# Patient Record
Sex: FEMALE | Race: WHITE | NOT HISPANIC OR LATINO | Employment: FULL TIME | RURAL
[De-identification: names, ages, dates, MRNs, and addresses within clinical notes are randomized per-mention and may not be internally consistent; named-entity substitution may affect disease eponyms.]

---

## 2022-06-14 ENCOUNTER — OFFICE VISIT (OUTPATIENT)
Dept: RHEUMATOLOGY | Facility: CLINIC | Age: 37
End: 2022-06-14
Payer: COMMERCIAL

## 2022-06-14 VITALS
WEIGHT: 125.81 LBS | DIASTOLIC BLOOD PRESSURE: 92 MMHG | RESPIRATION RATE: 16 BRPM | HEART RATE: 100 BPM | SYSTOLIC BLOOD PRESSURE: 138 MMHG | OXYGEN SATURATION: 97 %

## 2022-06-14 DIAGNOSIS — M47.899 HLA-B27 SPONDYLOARTHROPATHY: ICD-10-CM

## 2022-06-14 DIAGNOSIS — H57.9: Primary | ICD-10-CM

## 2022-06-14 DIAGNOSIS — K50.119 CROHN'S COLITIS, UNSPECIFIED COMPLICATION: ICD-10-CM

## 2022-06-14 DIAGNOSIS — H20.9 UVEITIS OF RIGHT EYE: ICD-10-CM

## 2022-06-14 DIAGNOSIS — M45.9: Primary | ICD-10-CM

## 2022-06-14 PROCEDURE — 1160F RVW MEDS BY RX/DR IN RCRD: CPT | Mod: ,,, | Performed by: INTERNAL MEDICINE

## 2022-06-14 PROCEDURE — 3075F PR MOST RECENT SYSTOLIC BLOOD PRESS GE 130-139MM HG: ICD-10-PCS | Mod: ,,, | Performed by: INTERNAL MEDICINE

## 2022-06-14 PROCEDURE — 99205 OFFICE O/P NEW HI 60 MIN: CPT | Mod: PBBFAC | Performed by: INTERNAL MEDICINE

## 2022-06-14 PROCEDURE — 1159F PR MEDICATION LIST DOCUMENTED IN MEDICAL RECORD: ICD-10-PCS | Mod: ,,, | Performed by: INTERNAL MEDICINE

## 2022-06-14 PROCEDURE — 1160F PR REVIEW ALL MEDS BY PRESCRIBER/CLIN PHARMACIST DOCUMENTED: ICD-10-PCS | Mod: ,,, | Performed by: INTERNAL MEDICINE

## 2022-06-14 PROCEDURE — 1159F MED LIST DOCD IN RCRD: CPT | Mod: ,,, | Performed by: INTERNAL MEDICINE

## 2022-06-14 PROCEDURE — 99205 PR OFFICE/OUTPT VISIT, NEW, LEVL V, 60-74 MIN: ICD-10-PCS | Mod: S$PBB,,, | Performed by: INTERNAL MEDICINE

## 2022-06-14 PROCEDURE — 3075F SYST BP GE 130 - 139MM HG: CPT | Mod: ,,, | Performed by: INTERNAL MEDICINE

## 2022-06-14 PROCEDURE — 99205 OFFICE O/P NEW HI 60 MIN: CPT | Mod: S$PBB,,, | Performed by: INTERNAL MEDICINE

## 2022-06-14 PROCEDURE — 3080F PR MOST RECENT DIASTOLIC BLOOD PRESSURE >= 90 MM HG: ICD-10-PCS | Mod: ,,, | Performed by: INTERNAL MEDICINE

## 2022-06-14 PROCEDURE — 3080F DIAST BP >= 90 MM HG: CPT | Mod: ,,, | Performed by: INTERNAL MEDICINE

## 2022-06-14 RX ORDER — PNV NO.95/FERROUS FUM/FOLIC AC 28MG-0.8MG
100 TABLET ORAL DAILY
COMMUNITY

## 2022-06-14 RX ORDER — ADALIMUMAB 80MG/0.8ML
KIT SUBCUTANEOUS
Qty: 3 EACH | Refills: 0 | Status: SHIPPED | OUTPATIENT
Start: 2022-06-14 | End: 2022-07-12

## 2022-06-14 RX ORDER — CYCLOBENZAPRINE HCL 5 MG
5 TABLET ORAL 3 TIMES DAILY PRN
COMMUNITY

## 2022-06-14 RX ORDER — GLYCOPYRROLATE 1 MG/1
1 TABLET ORAL 3 TIMES DAILY PRN
COMMUNITY
Start: 2022-05-19

## 2022-06-14 RX ORDER — TRAMADOL HYDROCHLORIDE 200 MG/1
TABLET, EXTENDED RELEASE ORAL
COMMUNITY
Start: 2022-05-19

## 2022-06-14 RX ORDER — MAGNESIUM GLUCONATE 27.5 (500)
250 TABLET ORAL ONCE
COMMUNITY

## 2022-06-14 RX ORDER — ONDANSETRON 4 MG/1
4 TABLET, FILM COATED ORAL EVERY 6 HOURS
COMMUNITY
Start: 2022-02-02

## 2022-06-14 RX ORDER — PREDNISONE 10 MG/1
TABLET ORAL
COMMUNITY
Start: 2022-01-06

## 2022-06-14 RX ORDER — HYOSCYAMINE SULFATE 0.125 MG
125 TABLET ORAL EVERY 4 HOURS PRN
COMMUNITY

## 2022-06-14 RX ORDER — ADALIMUMAB 40MG/0.8ML
40 KIT SUBCUTANEOUS
Qty: 2 PEN | Refills: 11 | Status: SHIPPED | OUTPATIENT
Start: 2022-06-14 | End: 2023-06-14

## 2022-06-14 NOTE — PROGRESS NOTES
Bobbi Ortega MD   [facility] RHEUMATOLOGY  [address] [address]  [phone]      PATIENT NAME: Sienna Cagle  : 1985  DATE: 22  MRN: [id number]      Billing Provider: Bobbi Ortega MD  Level of Service:   Patient PCP Information     Provider PCP Type    Chau Alvarez MD General          Reason for Visit / Chief Complaint: Joint Pain (C/o right side lower back pain)           Assessment and Plan (including Health Maintenance)      Problem List  Smart Sets  Document Outside HM   :    Plan:     Sarah is a pleasant 36-year-old female with an fortunately longstanding history of Crohn's colitis complicated by uveitis and spondyloarthritis.  She would benefit greatly from biologic DMARD therapies both in terms of her colitis as well as spondyloarthritis and uveitis.  Have started the process for Humira prior authorization.  There are 3 indications for Humira at the moment not least of which is the uveitis.  Checking for HLA B27 status and angled up SI joint x-rays to look for sacroiliitis.  She does have classic sacroiliitis symptoms.  Humira regimen will consist of a starter dose for addressing her Crohn's colitis followed by a maintenance dose which will address both the colitis and a spondyloarthritis.    We also discussed the importance of getting the Shingrix vaccination as soon as possible.  I have ordered Shingrix to [facility] in [place] per patient's request.  Hopefully she can get both doses there.    1. Ankylosing spondyloarthritis with lesion of eye  X-Ray Sacroiliac Joints Complete    adalimumab (HUMIRA,CF, PEDI CROHNS STARTER) 80 mg/0.8 mL SyKt    (In Office Administered) Zoster Recombinant Vaccine   2. Uveitis of right eye  adalimumab (HUMIRA PEN [id number] HS) PnKt injection   3. HLA-B27 spondyloarthropathy  Hepatitis C Antibody    Hepatitis B Surface Antigen    Hepatitis B Surface Ab, Qualitative    Quantiferon Gold TB    Cyclic  Citrullinated Peptide Antibody, IgG    Rheumatoid Quantitative    C-Reactive Protein    Sedimentation Rate    HLA-B27 antigen   4. Crohn's colitis, unspecified complication  adalimumab (HUMIRA,CF, PEDI CROHNS STARTER) 80 mg/0.8 mL SyKt    (In Office Administered) Zoster Recombinant Vaccine       CDAI Score: 5.8 / 76     Total time spent in Assessment, Co-ordination of Care , Review of Care Plan , Goal Setting and Counseling on this initial visit is ~ 60 minutes                    History of Present Illness / Problem Focused Workflow     Sienna Cagle presents to the clinic with Joint Pain (C/o right side lower back pain)     Sarah is a 36-year-old female who is here for evaluation of significant spinal stenosis and peripheral arthritis.  Of note she was diagnosed with Crohn's colitis in 1999 by Dr. Sanchez.  She also has a history of nephrocalcinosis and uveitis.  She brings with her a detailed written summary of her medical history.  It appears that she has had discomfort in her spine for the last 2 years.  She notices a flare in the spondyloarthritis around the same time that she also experiences a Crohn's flare.  Of note her colitis was severe enough back in 2014 to require partial ileectomy without a diverting colostomy.  She has intermittently experienced lower extremity and joint swelling and discomfort as well.  Patient has not been on methotrexate or biologic therapies either for the Crohn's or Crohn's spondyloarthritis.  MRI of her lumbar region was done 8 years ago.  Patient has a strong family history of Crohn's colitis in grandfather and 2 cousins.  She is managing her discomfort with a combination of Lyrica and Ultram.  Of note patient has recurrent uveitis in the right eye.  On average 1-2 attacks per month.  Her last attack of uveitis was just last week.  Uveitis attacks present as headaches and loss of vision.      Review of Systems     Review of Systems    Medical / Social / Family History     Past  Medical History:   Diagnosis Date    Crohn's disease     Uveitis        Past Surgical History:   Procedure Laterality Date    SMALL INTESTINE SURGERY         Social History  Ms. Sienna Cagle  reports that she has never smoked. She has never used smokeless tobacco.    Family History  Ms.'s Sienna Cagle family history includes Stroke in her father; Thyroid disease in her mother.    Medications and Allergies     Medications  Outpatient Medications Marked as Taking for the 6/14/22 encounter (Office Visit) with Bobbi Ortega MD   Medication Sig Dispense Refill    cyanocobalamin (VITAMIN B-12) 100 MCG tablet Take 100 mcg by mouth once daily.      cyclobenzaprine (FLEXERIL) 5 MG tablet Take 5 mg by mouth 3 (three) times daily as needed for Muscle spasms.      glycopyrrolate (ROBINUL) 1 mg Tab Take 1 mg by mouth 3 (three) times daily as needed.      hyoscyamine (ANASPAZ,LEVSIN) 0.125 mg Tab Take 125 mcg by mouth every 4 (four) hours as needed.      magnesium gluconate 27.5 mg magne- sium (500 mg) Tab Take 250 mg by mouth once.      ondansetron (ZOFRAN) 4 MG tablet Take 4 mg by mouth every 6 (six) hours.      predniSONE (DELTASONE) 10 MG tablet as needed.      traMADoL (ULTRAM-ER) 200 MG Tb24 TAKE 1 TABLET BY MOUTH once each day         Allergies  Review of patient's allergies indicates:   Allergen Reactions    Latex, natural rubber     Pcn [penicillins]     Sulfa (sulfonamide antibiotics) Rash       Physical Examination     Vitals:    06/14/22 1119   BP: (!) 138/92   Pulse: 100   Resp: 16     Physical Exam  Constitutional:       Appearance: She is obese.   HENT:      Head: Normocephalic and atraumatic.      Right Ear: External ear normal.      Left Ear: External ear normal.      Nose: Nose normal. No congestion or rhinorrhea.   Eyes:      Extraocular Movements: Extraocular movements intact.      Pupils: Pupils are equal, round, and reactive to light.   Cardiovascular:      Pulses: Normal pulses.    Pulmonary:      Effort: Pulmonary effort is normal.      Breath sounds: Normal breath sounds. No wheezing.   Chest:      Chest wall: No tenderness.   Musculoskeletal:         General: Normal range of motion.      Cervical back: No rigidity or tenderness.   Lymphadenopathy:      Cervical: No cervical adenopathy.   Skin:     Findings: No rash.   Neurological:      General: No focal deficit present.      Mental Status: She is alert and oriented to person, place, and time.   Psychiatric:         Mood and Affect: Mood normal.         Thought Content: Thought content normal.                Signature:  Bobbi Ortega MD  [facility] - RHEUMATOLOGY  [address] [place] [address]  [phone]    Date of encounter: 6/14/22

## 2022-06-21 ENCOUNTER — HOSPITAL ENCOUNTER (OUTPATIENT)
Dept: RADIOLOGY | Facility: HOSPITAL | Age: 37
Discharge: HOME | End: 2022-06-21
Attending: INTERNAL MEDICINE
Payer: COMMERCIAL

## 2022-06-21 DIAGNOSIS — M45.9: ICD-10-CM

## 2022-06-21 DIAGNOSIS — H57.9: ICD-10-CM

## 2022-06-21 PROCEDURE — 72202 X-RAY EXAM SI JOINTS 3/> VWS: CPT | Mod: 26,,, | Performed by: RADIOLOGY

## 2022-06-21 PROCEDURE — 72202 X-RAY EXAM SI JOINTS 3/> VWS: CPT | Mod: TC

## 2022-06-21 PROCEDURE — 72202 XR SACROILIAC JOINTS COMPLETE: ICD-10-PCS | Mod: 26,,, | Performed by: RADIOLOGY

## 2022-08-24 ENCOUNTER — OFFICE VISIT (OUTPATIENT)
Dept: RHEUMATOLOGY | Facility: CLINIC | Age: 37
End: 2022-08-24
Payer: COMMERCIAL

## 2022-08-24 VITALS — DIASTOLIC BLOOD PRESSURE: 88 MMHG | OXYGEN SATURATION: 96 % | SYSTOLIC BLOOD PRESSURE: 128 MMHG | HEART RATE: 102 BPM

## 2022-08-24 DIAGNOSIS — M47.899 HLA-B27 SPONDYLOARTHROPATHY: Primary | ICD-10-CM

## 2022-08-24 DIAGNOSIS — H57.9: ICD-10-CM

## 2022-08-24 DIAGNOSIS — M45.9: ICD-10-CM

## 2022-08-24 PROCEDURE — 99214 OFFICE O/P EST MOD 30 MIN: CPT | Mod: PBBFAC | Performed by: INTERNAL MEDICINE

## 2022-08-24 PROCEDURE — 1159F MED LIST DOCD IN RCRD: CPT | Mod: ,,, | Performed by: INTERNAL MEDICINE

## 2022-08-24 PROCEDURE — 99215 PR OFFICE/OUTPT VISIT, EST, LEVL V, 40-54 MIN: ICD-10-PCS | Mod: S$PBB,,, | Performed by: INTERNAL MEDICINE

## 2022-08-24 PROCEDURE — 1160F RVW MEDS BY RX/DR IN RCRD: CPT | Mod: ,,, | Performed by: INTERNAL MEDICINE

## 2022-08-24 PROCEDURE — 3079F PR MOST RECENT DIASTOLIC BLOOD PRESSURE 80-89 MM HG: ICD-10-PCS | Mod: ,,, | Performed by: INTERNAL MEDICINE

## 2022-08-24 PROCEDURE — 1160F PR REVIEW ALL MEDS BY PRESCRIBER/CLIN PHARMACIST DOCUMENTED: ICD-10-PCS | Mod: ,,, | Performed by: INTERNAL MEDICINE

## 2022-08-24 PROCEDURE — 3074F PR MOST RECENT SYSTOLIC BLOOD PRESSURE < 130 MM HG: ICD-10-PCS | Mod: ,,, | Performed by: INTERNAL MEDICINE

## 2022-08-24 PROCEDURE — 1159F PR MEDICATION LIST DOCUMENTED IN MEDICAL RECORD: ICD-10-PCS | Mod: ,,, | Performed by: INTERNAL MEDICINE

## 2022-08-24 PROCEDURE — 3074F SYST BP LT 130 MM HG: CPT | Mod: ,,, | Performed by: INTERNAL MEDICINE

## 2022-08-24 PROCEDURE — 3079F DIAST BP 80-89 MM HG: CPT | Mod: ,,, | Performed by: INTERNAL MEDICINE

## 2022-08-24 PROCEDURE — 99215 OFFICE O/P EST HI 40 MIN: CPT | Mod: S$PBB,,, | Performed by: INTERNAL MEDICINE

## 2022-08-24 RX ORDER — PANTOPRAZOLE SODIUM 40 MG/1
40 TABLET, DELAYED RELEASE ORAL DAILY
COMMUNITY
Start: 2022-04-21

## 2022-08-24 NOTE — PROGRESS NOTES
Bobbi Ortega MD   [facility] RHEUMATOLOGY  [address] [address]  [phone]      PATIENT NAME: Sienna Cagle  : 1985  DATE: 22  MRN: [id number]      Billing Provider: Bobbi Ortega MD  Level of Service:   Patient PCP Information     Provider PCP Type    Chau Alvarez MD General          Reason for Visit / Chief Complaint: Follow-up (Follow up ankylosing spondyloarthritis/Has started humira)           Assessment and Plan (including Health Maintenance)      Problem List  Smart Sets  Document Outside HM   :    Plan:     Sarah is a pleasant 36-year-old female with an fortunately longstanding history of Crohn's colitis complicated by uveitis and spondyloarthritis.  She would benefit greatly from biologic DMARD therapies both in terms of her colitis as well as spondyloarthritis and uveitis.  Have started the process for Humira prior authorization.  There are 3 indications for Humira at the moment not least of which is the uveitis.  Checking for HLA B27 status and angled up SI joint x-rays to look for sacroiliitis.  She does have classic sacroiliitis symptoms.  Humira regimen will consist of a starter dose for addressing her Crohn's colitis followed by a maintenance dose which will address both the colitis and a spondyloarthritis.    We also discussed the importance of getting the Shingrix vaccination as soon as possible.  I have ordered Shingrix to [facility] in [place] per patient's request.  Hopefully she can get both doses there.    2022:   Has started Humira and s/p Dose 3 of starter pack. Uveitis attack might have been averted last week but otherwise not noticing much in joints and digestive.   virutal f/up           History of Present Illness / Problem Focused Workflow     Sienna Cagle presents to the clinic with Follow-up (Follow up ankylosing spondyloarthritis/Has started humira)     Sarah is a 36-year-old female who is here for  evaluation of significant spinal stenosis and peripheral arthritis.  Of note she was diagnosed with Crohn's colitis in 1999 by Dr. Sanchez.  She also has a history of nephrocalcinosis and uveitis.  She brings with her a detailed written summary of her medical history.  It appears that she has had discomfort in her spine for the last 2 years.  She notices a flare in the spondyloarthritis around the same time that she also experiences a Crohn's flare.  Of note her colitis was severe enough back in 2014 to require partial ileectomy without a diverting colostomy.  She has intermittently experienced lower extremity and joint swelling and discomfort as well.  Patient has not been on methotrexate or biologic therapies either for the Crohn's or Crohn's spondyloarthritis.  MRI of her lumbar region was done 8 years ago.  Patient has a strong family history of Crohn's colitis in grandfather and 2 cousins.  She is managing her discomfort with a combination of Lyrica and Ultram.  Of note patient has recurrent uveitis in the right eye.  On average 1-2 attacks per month.  Her last attack of uveitis was just last week.  Uveitis attacks present as headaches and loss of vision.      Review of Systems     Review of Systems   Constitutional: Negative for fever and unexpected weight change.   HENT: Negative for mouth sores and trouble swallowing.    Eyes: Positive for redness.   Respiratory: Negative for cough and shortness of breath.    Cardiovascular: Negative for chest pain.   Gastrointestinal: Negative for constipation and diarrhea.   Genitourinary: Negative for dysuria and genital sores.   Skin: Negative for rash.   Neurological: Positive for headaches.   Hematological: Does not bruise/bleed easily.       Medical / Social / Family History     Past Medical History:   Diagnosis Date    Crohn's disease     Uveitis        Past Surgical History:   Procedure Laterality Date    SMALL INTESTINE SURGERY         Social History  Ms. El  Gurjit  reports that she has never smoked. She has never used smokeless tobacco.    Family History  Ms.'s Sienna Cagle family history includes Stroke in her father; Thyroid disease in her mother.    Medications and Allergies     Medications  Outpatient Medications Marked as Taking for the 8/24/22 encounter (Office Visit) with Bobbi Ortega MD   Medication Sig Dispense Refill    adalimumab (HUMIRA PEN [id number] HS) PnKt injection Inject 1 pen (40 mg total) into the skin every 14 (fourteen) days. 2 pen 11    cyclobenzaprine (FLEXERIL) 5 MG tablet Take 5 mg by mouth 3 (three) times daily as needed for Muscle spasms.      glycopyrrolate (ROBINUL) 1 mg Tab Take 1 mg by mouth 3 (three) times daily as needed.      hyoscyamine (ANASPAZ,LEVSIN) 0.125 mg Tab Take 125 mcg by mouth every 4 (four) hours as needed.      magnesium gluconate 27.5 mg magne- sium (500 mg) Tab Take 250 mg by mouth once.      ondansetron (ZOFRAN) 4 MG tablet Take 4 mg by mouth every 6 (six) hours.      pantoprazole (PROTONIX) 40 MG tablet Take 40 mg by mouth once daily.      traMADoL (ULTRAM-ER) 200 MG Tb24 TAKE 1 TABLET BY MOUTH once each day         Allergies  Review of patient's allergies indicates:   Allergen Reactions    Allegra-d 12 hour [fexofenadine-pseudoephedrine] Other (See Comments)    Latex, natural rubber     Pcn [penicillins]     Sulfa (sulfonamide antibiotics) Rash       Physical Examination     Vitals:    08/24/22 1537   BP: 128/88   Pulse: 102     Physical Exam  Constitutional:       Appearance: She is obese.   HENT:      Head: Normocephalic and atraumatic.      Right Ear: External ear normal.      Left Ear: External ear normal.      Nose: Nose normal. No congestion or rhinorrhea.   Eyes:      Extraocular Movements: Extraocular movements intact.      Pupils: Pupils are equal, round, and reactive to light.   Cardiovascular:      Pulses: Normal pulses.   Pulmonary:      Effort: Pulmonary effort is normal.       Breath sounds: Normal breath sounds. No wheezing.   Chest:      Chest wall: No tenderness.   Musculoskeletal:         General: Normal range of motion.      Cervical back: No rigidity or tenderness.   Lymphadenopathy:      Cervical: No cervical adenopathy.   Skin:     Findings: No rash.   Neurological:      General: No focal deficit present.      Mental Status: She is alert and oriented to person, place, and time.   Psychiatric:         Mood and Affect: Mood normal.         Thought Content: Thought content normal.                Signature:  Bobbi Ortega MD  [facility] RHEUMATOLOGY  [address] [place] [address]  [phone]    Date of encounter: 8/24/22

## 2022-08-24 NOTE — Clinical Note
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 Will handle virtual visits well. Please set up her for December.

## 2022-12-21 ENCOUNTER — OFFICE VISIT (OUTPATIENT)
Dept: RHEUMATOLOGY | Facility: CLINIC | Age: 37
End: 2022-12-21
Payer: COMMERCIAL

## 2022-12-21 DIAGNOSIS — M47.899 HLA-B27 SPONDYLOARTHROPATHY: Primary | ICD-10-CM

## 2022-12-21 DIAGNOSIS — H57.9: ICD-10-CM

## 2022-12-21 DIAGNOSIS — M45.9: ICD-10-CM

## 2022-12-21 DIAGNOSIS — H20.9 UVEITIS OF RIGHT EYE: ICD-10-CM

## 2022-12-21 PROCEDURE — 99215 OFFICE O/P EST HI 40 MIN: CPT | Mod: 95,,, | Performed by: INTERNAL MEDICINE

## 2022-12-21 PROCEDURE — 99215 PR OFFICE/OUTPT VISIT, EST, LEVL V, 40-54 MIN: ICD-10-PCS | Mod: 95,,, | Performed by: INTERNAL MEDICINE

## 2022-12-21 NOTE — PROGRESS NOTES
Bobbi Ortega MD   [facility] RHEUMATOLOGY  [address] [address]  [phone]      PATIENT NAME: Sienna Cagle  : 1985  DATE: 22  MRN: [id number]      Billing Provider: Bobbi Ortega MD  Level of Service:   Patient PCP Information       Provider PCP Type    Chau Alvarez MD General            The patient location is: work  The chief complaint leading to consultation is: high risk medication use    Visit type:     Face to Face time with patient: 30  30   minutes of total time spent on the encounter, which includes face to face time and non-face to face time preparing to see the patient (eg, review of tests), Obtaining and/or reviewing separately obtained history, Documenting clinical information in the electronic or other health record, Independently interpreting results (not separately reported) and communicating results to the patient/family/caregiver, or Care coordination (not separately reported).         Each patient to whom he or she provides medical services by telemedicine is:  (1) informed of the relationship between the physician and patient and the respective role of any other health care provider with respect to management of the patient; and (2) notified that he or she may decline to receive medical services by telemedicine and may withdraw from such care at any time.    Notes:   uveitis and crohn's assessment     Reason for Visit / Chief Complaint: No chief complaint on file.           Assessment and Plan (including Health Maintenance)      Problem List  Smart Sets  Document Outside HM   :    Plan:     Sarah is a pleasant 36-year-old female with an fortunately longstanding history of Crohn's colitis complicated by uveitis and spondyloarthritis.  She would benefit greatly from biologic DMARD therapies both in terms of her colitis as well as spondyloarthritis and uveitis.  Have started the process for Humira prior authorization.   There are 3 indications for Humira at the moment not least of which is the uveitis.  Checking for HLA B27 status and angled up SI joint x-rays to look for sacroiliitis.  She does have classic sacroiliitis symptoms.  Humira regimen will consist of a starter dose for addressing her Crohn's colitis followed by a maintenance dose which will address both the colitis and a spondyloarthritis.    We also discussed the importance of getting the Shingrix vaccination as soon as possible.  I have ordered Shingrix to [facility] in [place] per patient's request.  Hopefully she can get both doses there.    08/24/2022:   Has started Humira and s/p Dose 3 of starter pack. Uveitis attack might have been averted last week but otherwise not noticing much in joints and digestive.   virutal f/up     Dec 21 2022:   No uveitits attacks since august 2022. Did have a mild attack of Crohns, 3rd week of September.   Is not actively using prednisone but has some pills on hand just in case of arthritis flare.   Next Humira injection is tomorrow.           History of Present Illness / Problem Focused Workflow     Sienna Cagle presents to the clinic with No chief complaint on file.     Sarah is a 36-year-old female who is here for evaluation of significant spinal stenosis and peripheral arthritis.  Of note she was diagnosed with Crohn's colitis in 1999 by Dr. Sanchez.  She also has a history of nephrocalcinosis and uveitis.  She brings with her a detailed written summary of her medical history.  It appears that she has had discomfort in her spine for the last 2 years.  She notices a flare in the spondyloarthritis around the same time that she also experiences a Crohn's flare.  Of note her colitis was severe enough back in 2014 to require partial ileectomy without a diverting colostomy.  She has intermittently experienced lower extremity and joint swelling and discomfort as well.  Patient has not been on methotrexate or biologic therapies either for  the Crohn's or Crohn's spondyloarthritis.  MRI of her lumbar region was done 8 years ago.  Patient has a strong family history of Crohn's colitis in grandfather and 2 cousins.  She is managing her discomfort with a combination of Lyrica and Ultram.  Of note patient has recurrent uveitis in the right eye.  On average 1-2 attacks per month.  Her last attack of uveitis was just last week.  Uveitis attacks present as headaches and loss of vision.      Review of Systems     Review of Systems   Constitutional:  Negative for fever and unexpected weight change.   HENT:  Negative for mouth sores and trouble swallowing.    Eyes:  Positive for redness.   Respiratory:  Negative for cough and shortness of breath.    Cardiovascular:  Negative for chest pain.   Gastrointestinal:  Negative for constipation and diarrhea.   Genitourinary:  Negative for dysuria and genital sores.   Skin:  Negative for rash.   Neurological:  Positive for headaches.   Hematological:  Does not bruise/bleed easily.       Medical / Social / Family History     Past Medical History:   Diagnosis Date    Crohn's disease     Uveitis        Past Surgical History:   Procedure Laterality Date    SMALL INTESTINE SURGERY         Social History  Ms. Sienna Cagle  reports that she has never smoked. She has never used smokeless tobacco.    Family History  Ms.'david Cagle family history includes Stroke in her father; Thyroid disease in her mother.    Medications and Allergies     Medications  No outpatient medications have been marked as taking for the 12/21/22 encounter (Appointment) with Bobbi Ortega MD.       Allergies  Review of patient's allergies indicates:   Allergen Reactions    Allegra-d 12 hour [fexofenadine-pseudoephedrine] Other (See Comments)    Latex, natural rubber     Pcn [penicillins]     Sulfa (sulfonamide antibiotics) Rash       Physical Examination     There were no vitals filed for this visit.    Physical Exam  Constitutional:        Appearance: She is obese.   HENT:      Head: Normocephalic and atraumatic.      Right Ear: External ear normal.      Left Ear: External ear normal.      Nose: Nose normal. No congestion or rhinorrhea.   Eyes:      Extraocular Movements: Extraocular movements intact.      Pupils: Pupils are equal, round, and reactive to light.   Cardiovascular:      Pulses: Normal pulses.   Pulmonary:      Effort: Pulmonary effort is normal.      Breath sounds: Normal breath sounds. No wheezing.   Chest:      Chest wall: No tenderness.   Musculoskeletal:         General: Normal range of motion.      Cervical back: No rigidity or tenderness.   Lymphadenopathy:      Cervical: No cervical adenopathy.   Skin:     Findings: No rash.   Neurological:      General: No focal deficit present.      Mental Status: She is alert and oriented to person, place, and time.   Psychiatric:         Mood and Affect: Mood normal.         Thought Content: Thought content normal.              Signature:  Bobbi Ortega MD  [facility] - RHEUMATOLOGY  [address] [place] [address]  [phone]    Date of encounter: 12/21/22

## 2023-05-24 RX ORDER — ADALIMUMAB 40MG/0.4ML
KIT SUBCUTANEOUS
Qty: 2 PEN | Refills: 2 | Status: SHIPPED | OUTPATIENT
Start: 2023-05-24 | End: 2023-08-07 | Stop reason: SDUPTHER

## 2023-08-07 DIAGNOSIS — M45.9: Primary | ICD-10-CM

## 2023-08-07 DIAGNOSIS — H57.9: Primary | ICD-10-CM

## 2023-08-07 RX ORDER — ADALIMUMAB 40MG/0.4ML
KIT SUBCUTANEOUS
Qty: 2 PEN | Refills: 2 | Status: SHIPPED | OUTPATIENT
Start: 2023-08-07

## 2023-08-07 NOTE — TELEPHONE ENCOUNTER
Refill sent to [facility] per [facility]    [facility] approval 8/2/23 - 8/2/24

## 2023-11-03 DIAGNOSIS — H20.9 UVEITIS OF RIGHT EYE: Primary | ICD-10-CM

## 2023-11-08 RX ORDER — ADALIMUMAB-BWWD 40 MG/.4ML
SOLUTION SUBCUTANEOUS
Qty: 40 ML | Refills: 2 | Status: SHIPPED | OUTPATIENT
Start: 2023-11-08 | End: 2024-02-23

## 2024-02-23 ENCOUNTER — PATIENT MESSAGE (OUTPATIENT)
Dept: RHEUMATOLOGY | Facility: CLINIC | Age: 39
End: 2024-02-23
Payer: COMMERCIAL

## 2024-02-23 DIAGNOSIS — M45.9: Primary | ICD-10-CM

## 2024-02-23 DIAGNOSIS — H20.9 UVEITIS OF RIGHT EYE: ICD-10-CM

## 2024-02-23 DIAGNOSIS — M47.899 HLA-B27 SPONDYLOARTHROPATHY: ICD-10-CM

## 2024-02-23 DIAGNOSIS — H57.9: Primary | ICD-10-CM

## 2024-02-23 RX ORDER — ADALIMUMAB-BWWD 40 MG/.4ML
40 SOLUTION SUBCUTANEOUS
Qty: 0.4 ML | Refills: 6 | Status: SHIPPED | OUTPATIENT
Start: 2024-02-23 | End: 2024-08-21

## 2024-02-23 NOTE — TELEPHONE ENCOUNTER
Please see the attached refill request.    SENT [facility] TO SCHEDULE FOLLOW UP. HAS NOT BEEN SEEN SINCE 2022